# Patient Record
Sex: MALE | URBAN - METROPOLITAN AREA
[De-identification: names, ages, dates, MRNs, and addresses within clinical notes are randomized per-mention and may not be internally consistent; named-entity substitution may affect disease eponyms.]

---
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
## 2021-06-16 ENCOUNTER — OFFICE VISIT (OUTPATIENT)
Dept: URBAN - METROPOLITAN AREA CLINIC 113 | Facility: CLINIC | Age: 21
End: 2021-06-16

## 2022-03-04 ENCOUNTER — DASHBOARD ENCOUNTERS (OUTPATIENT)
Age: 22
End: 2022-03-04

## 2022-03-04 ENCOUNTER — OFFICE VISIT (OUTPATIENT)
Dept: URBAN - METROPOLITAN AREA CLINIC 113 | Facility: CLINIC | Age: 22
End: 2022-03-04
Payer: COMMERCIAL

## 2022-03-04 ENCOUNTER — WEB ENCOUNTER (OUTPATIENT)
Dept: URBAN - METROPOLITAN AREA CLINIC 113 | Facility: CLINIC | Age: 22
End: 2022-03-04

## 2022-03-04 VITALS
HEART RATE: 68 BPM | HEIGHT: 73 IN | SYSTOLIC BLOOD PRESSURE: 110 MMHG | RESPIRATION RATE: 20 BRPM | WEIGHT: 193.8 LBS | BODY MASS INDEX: 25.69 KG/M2 | DIASTOLIC BLOOD PRESSURE: 69 MMHG | TEMPERATURE: 97.7 F

## 2022-03-04 DIAGNOSIS — K13.0 ANGULAR CHEILITIS: ICD-10-CM

## 2022-03-04 DIAGNOSIS — K50.90 CROHN'S DISEASE WITHOUT COMPLICATION, UNSPECIFIED GASTROINTESTINAL TRACT LOCATION: ICD-10-CM

## 2022-03-04 PROCEDURE — 99204 OFFICE O/P NEW MOD 45 MIN: CPT | Performed by: NURSE PRACTITIONER

## 2022-03-04 RX ORDER — INFLIXIMAB 100 MG/10ML
AS DIRECTED INJECTION, POWDER, LYOPHILIZED, FOR SOLUTION INTRAVENOUS
Status: ACTIVE | COMMUNITY

## 2022-03-04 RX ORDER — INFLIXIMAB 100 MG/10ML
AS DIRECTED INJECTION, POWDER, LYOPHILIZED, FOR SOLUTION INTRAVENOUS
Qty: 100 MILLIGRAMS | Refills: 0 | OUTPATIENT
Start: 2022-03-04 | End: 2022-04-03

## 2022-03-04 NOTE — HPI-TODAY'S VISIT:
21yo male presenting for evaluation of Crohn's disease.  He moved to [place] about 1.5 years ago. He has been getting Remicade 5 mg/kg every 8 weeks at [facility]. He was diagnosed with Crohn's disease around age 13 years of age. He has had colonoscopies and endoscopies in the past. His last colonoscopy was about 3 years ago, performed in [place]. His prior GI MD was Dr. Sharmila Stark (pediatric GI) in [place]. He believes his Crohn's disease affects both his small and large intestines.  There is no dysphagia, heartburn. He occasionally has lower abdominal pain chracterized as a "discomfort." This can improve with bowel movements. He has bowel movements twice daily. There is no blood per rectum. Stools are normal form. There is no nocturnal stools or incomplete defecation. There is no new cough, fever, chills, shortness, or joint pains.   He is overdue for Remicade infusion. His last infusion was in September 2021.

## 2022-03-04 NOTE — HPI-OTHER HISTORIES
Labs 9/9/21: WBC 6.6, Hgb 14.1, Hct 40, MCV 92. Plt 149, BUN 12, Crt 0.74, NA 16, L 4.1, Tbili 0.5, ALP 51, AST 23, ALT 13, ESR 17, CRP 3.

## 2022-03-09 LAB
A/G RATIO: 1.8
ALBUMIN: 4.6
ALKALINE PHOSPHATASE: 52
ALT (SGPT): 19
AST (SGOT): 34
BASO (ABSOLUTE): 0
BASOS: 1
BILIRUBIN, TOTAL: 0.7
BUN/CREATININE RATIO: 18
BUN: 17
C-REACTIVE PROTEIN, QUANT: 2
CALCIUM: 9.4
CARBON DIOXIDE, TOTAL: 24
CHLORIDE: 103
CREATININE: 0.97
EGFR: 114
EOS (ABSOLUTE): 0.2
EOS: 3
GLOBULIN, TOTAL: 2.6
GLUCOSE: 85
HBSAG SCREEN: NEGATIVE
HEMATOCRIT: 41
HEMATOLOGY COMMENTS:: (no result)
HEMOGLOBIN: 14.1
HEP B CORE AB, TOT: NEGATIVE
HEP B SURFACE AB, QUAL: REACTIVE
IMMATURE CELLS: (no result)
IMMATURE GRANS (ABS): 0
IMMATURE GRANULOCYTES: 0
LYMPHS (ABSOLUTE): 1.7
LYMPHS: 25
MCH: 31.8
MCHC: 34.4
MCV: 93
MONOCYTES(ABSOLUTE): 0.6
MONOCYTES: 10
NEUTROPHILS (ABSOLUTE): 4
NEUTROPHILS: 61
NRBC: (no result)
PLATELETS: 173
POTASSIUM: 4
PROTEIN, TOTAL: 7.2
QUANTIFERON CRITERIA: (no result)
QUANTIFERON INCUBATION: (no result)
QUANTIFERON MITOGEN VALUE: >10
QUANTIFERON NIL VALUE: 0.05
QUANTIFERON TB1 AG VALUE: 0.05
QUANTIFERON TB2 AG VALUE: 0.06
QUANTIFERON-TB GOLD PLUS: NEGATIVE
RBC: 4.43
RDW: 12.6
SEDIMENTATION RATE-WESTERGREN: 4
SODIUM: 140
WBC: 6.5

## 2022-05-04 ENCOUNTER — OFFICE VISIT (OUTPATIENT)
Dept: URBAN - METROPOLITAN AREA CLINIC 113 | Facility: CLINIC | Age: 22
End: 2022-05-04

## 2022-05-04 PROBLEM — 34000006: Status: ACTIVE | Noted: 2022-03-04

## 2022-05-04 RX ORDER — INFLIXIMAB 100 MG/10ML
AS DIRECTED INJECTION, POWDER, LYOPHILIZED, FOR SOLUTION INTRAVENOUS
Status: ACTIVE | COMMUNITY

## 2022-07-29 ENCOUNTER — TELEPHONE ENCOUNTER (OUTPATIENT)
Dept: URBAN - METROPOLITAN AREA CLINIC 113 | Facility: CLINIC | Age: 22
End: 2022-07-29

## 2022-07-29 RX ORDER — INFLIXIMAB 100 MG/10ML: AS DIRECTED INJECTION, POWDER, LYOPHILIZED, FOR SOLUTION INTRAVENOUS

## 2022-08-25 ENCOUNTER — TELEPHONE ENCOUNTER (OUTPATIENT)
Dept: URBAN - METROPOLITAN AREA CLINIC 113 | Facility: CLINIC | Age: 22
End: 2022-08-25

## 2022-08-25 RX ORDER — INFLIXIMAB 100 MG/10ML
AS DIRECTED INJECTION, POWDER, LYOPHILIZED, FOR SOLUTION INTRAVENOUS
Qty: 100 MILLIGRAMS | Refills: 0
Start: 2022-03-04 | End: 2022-10-01

## 2022-08-25 RX ORDER — INFLIXIMAB 100 MG/10ML: AS DIRECTED INJECTION, POWDER, LYOPHILIZED, FOR SOLUTION INTRAVENOUS
